# Patient Record
Sex: MALE | Race: WHITE | NOT HISPANIC OR LATINO | URBAN - METROPOLITAN AREA
[De-identification: names, ages, dates, MRNs, and addresses within clinical notes are randomized per-mention and may not be internally consistent; named-entity substitution may affect disease eponyms.]

---

## 2024-01-05 ENCOUNTER — LAB OUTSIDE AN ENCOUNTER (OUTPATIENT)
Dept: URBAN - METROPOLITAN AREA CLINIC 105 | Facility: CLINIC | Age: 69
End: 2024-01-05

## 2024-01-05 ENCOUNTER — OFFICE VISIT (OUTPATIENT)
Dept: URBAN - METROPOLITAN AREA CLINIC 105 | Facility: CLINIC | Age: 69
End: 2024-01-05
Payer: COMMERCIAL

## 2024-01-05 VITALS
HEIGHT: 67 IN | TEMPERATURE: 97 F | SYSTOLIC BLOOD PRESSURE: 155 MMHG | BODY MASS INDEX: 29.44 KG/M2 | HEART RATE: 57 BPM | WEIGHT: 187.6 LBS | DIASTOLIC BLOOD PRESSURE: 69 MMHG

## 2024-01-05 DIAGNOSIS — K21.9 ACID REFLUX: ICD-10-CM

## 2024-01-05 DIAGNOSIS — R14.0 ABDOMINAL BLOATING: ICD-10-CM

## 2024-01-05 DIAGNOSIS — R19.7 ACUTE DIARRHEA: ICD-10-CM

## 2024-01-05 DIAGNOSIS — K62.5 RECTAL BLEEDING: ICD-10-CM

## 2024-01-05 PROBLEM — 428375006: Status: ACTIVE | Noted: 2024-01-05

## 2024-01-05 PROBLEM — 53741008: Status: ACTIVE | Noted: 2024-01-05

## 2024-01-05 PROBLEM — 235595009: Status: ACTIVE | Noted: 2024-01-05

## 2024-01-05 PROBLEM — 86406008: Status: ACTIVE | Noted: 2024-01-05

## 2024-01-05 PROBLEM — 428283002: Status: ACTIVE | Noted: 2024-01-05

## 2024-01-05 PROCEDURE — 99244 OFF/OP CNSLTJ NEW/EST MOD 40: CPT | Performed by: INTERNAL MEDICINE

## 2024-01-05 PROCEDURE — 99204 OFFICE O/P NEW MOD 45 MIN: CPT | Performed by: INTERNAL MEDICINE

## 2024-01-05 RX ORDER — ROSUVASTATIN CALCIUM 20 MG/1
TABLET, FILM COATED ORAL
Qty: 90 TABLET | Status: ACTIVE | COMMUNITY

## 2024-01-05 RX ORDER — BACLOFEN 10 MG/1
TABLET ORAL
Qty: 90 TABLET | Status: ACTIVE | COMMUNITY

## 2024-01-05 RX ORDER — VALACYCLOVIR 500 MG/1
TAKE 1 TABLET BY MOUTH TWICE A DAY TABLET, FILM COATED ORAL
Qty: 16 EACH | Refills: 0 | Status: ON HOLD | COMMUNITY

## 2024-01-05 RX ORDER — TADALAFIL 5 MG/1
TABLET ORAL
Qty: 90 TABLET | Status: ACTIVE | COMMUNITY

## 2024-01-05 RX ORDER — TESTOSTERONE ENANTHATE 100 MG/.5ML
INJECTION SUBCUTANEOUS
Qty: 2 MILLILITER | Status: ACTIVE | COMMUNITY

## 2024-01-05 RX ORDER — VALACYCLOVIR 500 MG/1
TAKE 1 TABLET BY MOUTH TWICE A DAY TABLET, FILM COATED ORAL
Qty: 16 EACH | Refills: 0 | Status: ACTIVE | COMMUNITY

## 2024-01-05 RX ORDER — CABOTEGRAVIR AND RILPIVIRINE 600-900/3
KIT INTRAMUSCULAR
Qty: 6 MILLILITER | Status: ACTIVE | COMMUNITY

## 2024-01-05 RX ORDER — EZETIMIBE 10 MG/1
TABLET ORAL
Qty: 90 TABLET | Status: ACTIVE | COMMUNITY

## 2024-01-05 RX ORDER — OMEPRAZOLE 40 MG/1
1 CAPSULE 30 MINUTES BEFORE MORNING MEAL CAPSULE, DELAYED RELEASE ORAL ONCE A DAY
Qty: 90 | Refills: 0 | OUTPATIENT
Start: 2024-01-05

## 2024-01-05 RX ORDER — TRAMADOL HYDROCHLORIDE 50 MG/1
TABLET, FILM COATED ORAL
Qty: 28 TABLET | Status: ACTIVE | COMMUNITY

## 2024-01-05 NOTE — HPI-TODAY'S VISIT:
New patient 69 yo male with PMH of HIV, CAD s/p stent placement x2, and HTN referred by Dr. Eliseo Christy for an EGD consult.   Patient has symptoms of regurgitation, dry cough, heartburn, nausea, abdominal bloating, and early satiety. Symptoms have been prominent for years and occurs mostly at night. He used to take Pepto Bismol for relieve until his PCP had him discontinue it.  Last Saturday he had early satiety in the morning, he had grits with fruit and was full until dinner. During dinner time he was only able to have a few bites of his meal. He takes ASA daily for CAD. He uses Ibuprofen for leg pain.  Denies dysphagia, melena and unintentional weight loss. Diarrhea x 2 weeks. It is currently active at the moment. About 4-5 stools a day of loose to watery, explosive stools when it first started. Now he describes his stools as "many soft pellets".  He had chills but denies fever. He is currently taking Imodium as needed.  He admits to some intermittent rectal bleeding on TP. He denies hx of constipation. Usually has a BM every morning with complete evacuation. Denies recent travel or antibiotic use.

## 2024-01-13 LAB
ADENOVIRUS F 40/41: NOT DETECTED
CAMPYLOBACTER: NOT DETECTED
CLOSTRIDIUM DIFFICILE: NOT DETECTED
ENTAMOEBA HISTOLYTICA: NOT DETECTED
ENTEROAGGREGATIVE E.COLI: NOT DETECTED
ENTEROTOXIGENIC E.COLI: NOT DETECTED
ESCHERICHIA COLI O157: NOT DETECTED
GIARDIA LAMBLIA: NOT DETECTED
NOROVIRUS GI/GII: NOT DETECTED
ROTAVIRUS A: NOT DETECTED
SALMONELLA SPP.: NOT DETECTED
SHIGA-LIKE TOXIN PRODUCING E.COLI: NOT DETECTED
SHIGELLA SPP. / ENTEROINVASIVE E.COLI: NOT DETECTED
VIBRIO PARAHAEMOLYTICUS: NOT DETECTED
VIBRIO SPP.: NOT DETECTED
YERSINIA ENTEROCOLITICA: NOT DETECTED

## 2024-01-17 ENCOUNTER — TELEPHONE ENCOUNTER (OUTPATIENT)
Dept: URBAN - METROPOLITAN AREA SURGERY CENTER 16 | Facility: SURGERY CENTER | Age: 69
End: 2024-01-17

## 2024-01-18 LAB
A/G RATIO: 1.6
ABSOLUTE BASOPHILS: 31
ABSOLUTE EOSINOPHILS: 92
ABSOLUTE LYMPHOCYTES: 1885
ABSOLUTE MONOCYTES: 647
ABSOLUTE NEUTROPHILS: 3447
ALBUMIN: 4.2
ALKALINE PHOSPHATASE: 65
ALT (SGPT): 25
AST (SGOT): 26
BASOPHILS: 0.5
BILIRUBIN, TOTAL: 0.4
BUN/CREATININE RATIO: (no result)
BUN: 19
CALCIUM: 8.8
CARBON DIOXIDE, TOTAL: 28
CHLORIDE: 102
CREATININE: 1.28
EGFR: 62
EOSINOPHILS: 1.5
FERRITIN, SERUM: 139
GLOBULIN, TOTAL: 2.6
GLUCOSE: 99
HEMATOCRIT: 41.2
HEMOGLOBIN: 14.2
IRON BIND.CAP.(TIBC): 312
IRON SATURATION: 16
IRON: 51
LIPASE: 23
LYMPHOCYTES: 30.9
MCH: 28.6
MCHC: 34.5
MCV: 82.9
MONOCYTES: 10.6
MPV: 11
NEUTROPHILS: 56.5
PLATELET COUNT: 163
POTASSIUM: 4.1
PROTEIN, TOTAL: 6.8
RDW: 14.6
RED BLOOD CELL COUNT: 4.97
SODIUM: 139
TSH W/REFLEX TO FT4: 1.89
WHITE BLOOD CELL COUNT: 6.1

## 2024-01-25 ENCOUNTER — OUT OF OFFICE VISIT (OUTPATIENT)
Dept: URBAN - METROPOLITAN AREA SURGERY CENTER 16 | Facility: SURGERY CENTER | Age: 69
End: 2024-01-25
Payer: COMMERCIAL

## 2024-01-25 ENCOUNTER — CLAIMS CREATED FROM THE CLAIM WINDOW (OUTPATIENT)
Dept: URBAN - METROPOLITAN AREA CLINIC 4 | Facility: CLINIC | Age: 69
End: 2024-01-25
Payer: COMMERCIAL

## 2024-01-25 DIAGNOSIS — K29.60 ADENOPAPILLOMATOSIS GASTRICA: ICD-10-CM

## 2024-01-25 DIAGNOSIS — K64.8 EXTERNAL HEMORRHOIDS: ICD-10-CM

## 2024-01-25 DIAGNOSIS — K62.89 ACUTE PROCTITIS: ICD-10-CM

## 2024-01-25 DIAGNOSIS — K21.9 GASTRO-ESOPHAGEAL REFLUX DISEASE WITHOUT ESOPHAGITIS: ICD-10-CM

## 2024-01-25 DIAGNOSIS — K57.30 ACQUIRED DIVERTICULOSIS OF COLON: ICD-10-CM

## 2024-01-25 DIAGNOSIS — K31.89 ACHYLIA: ICD-10-CM

## 2024-01-25 DIAGNOSIS — K21.9 ACID REFLUX: ICD-10-CM

## 2024-01-25 DIAGNOSIS — K63.89 APPENDICITIS EPIPLOICA: ICD-10-CM

## 2024-01-25 DIAGNOSIS — K31.89 OTHER DISEASES OF STOMACH AND DUODENUM: ICD-10-CM

## 2024-01-25 DIAGNOSIS — R19.7 ACUTE DIARRHEA: ICD-10-CM

## 2024-01-25 DIAGNOSIS — K29.70 GASTRITIS, UNSPECIFIED, WITHOUT BLEEDING: ICD-10-CM

## 2024-01-25 PROCEDURE — 45380 COLONOSCOPY AND BIOPSY: CPT | Performed by: INTERNAL MEDICINE

## 2024-01-25 PROCEDURE — 00813 ANES UPR LWR GI NDSC PX: CPT | Performed by: NURSE ANESTHETIST, CERTIFIED REGISTERED

## 2024-01-25 PROCEDURE — 88312 SPECIAL STAINS GROUP 1: CPT | Performed by: PATHOLOGY

## 2024-01-25 PROCEDURE — 43239 EGD BIOPSY SINGLE/MULTIPLE: CPT | Performed by: INTERNAL MEDICINE

## 2024-01-25 PROCEDURE — G8907 PT DOC NO EVENTS ON DISCHARG: HCPCS | Performed by: INTERNAL MEDICINE

## 2024-01-25 PROCEDURE — 88342 IMHCHEM/IMCYTCHM 1ST ANTB: CPT | Performed by: PATHOLOGY

## 2024-01-25 PROCEDURE — 88305 TISSUE EXAM BY PATHOLOGIST: CPT | Performed by: PATHOLOGY

## 2024-01-25 PROCEDURE — 00813 ANES UPR LWR GI NDSC PX: CPT | Performed by: ANESTHESIOLOGY

## 2024-01-25 RX ORDER — CABOTEGRAVIR AND RILPIVIRINE 600-900/3
KIT INTRAMUSCULAR
Qty: 6 MILLILITER | Status: ACTIVE | COMMUNITY

## 2024-01-25 RX ORDER — OMEPRAZOLE 40 MG/1
1 CAPSULE 30 MINUTES BEFORE MORNING MEAL CAPSULE, DELAYED RELEASE ORAL ONCE A DAY
Qty: 90 | Refills: 0 | Status: ACTIVE | COMMUNITY
Start: 2024-01-05

## 2024-01-25 RX ORDER — BACLOFEN 10 MG/1
TABLET ORAL
Qty: 90 TABLET | Status: ACTIVE | COMMUNITY

## 2024-01-25 RX ORDER — TESTOSTERONE ENANTHATE 100 MG/.5ML
INJECTION SUBCUTANEOUS
Qty: 2 MILLILITER | Status: ACTIVE | COMMUNITY

## 2024-01-25 RX ORDER — VALACYCLOVIR 500 MG/1
TAKE 1 TABLET BY MOUTH TWICE A DAY TABLET, FILM COATED ORAL
Qty: 16 EACH | Refills: 0 | Status: ON HOLD | COMMUNITY

## 2024-01-25 RX ORDER — VALACYCLOVIR 500 MG/1
TAKE 1 TABLET BY MOUTH TWICE A DAY TABLET, FILM COATED ORAL
Qty: 16 EACH | Refills: 0 | Status: ACTIVE | COMMUNITY

## 2024-01-25 RX ORDER — EZETIMIBE 10 MG/1
TABLET ORAL
Qty: 90 TABLET | Status: ACTIVE | COMMUNITY

## 2024-01-25 RX ORDER — ROSUVASTATIN CALCIUM 20 MG/1
TABLET, FILM COATED ORAL
Qty: 90 TABLET | Status: ACTIVE | COMMUNITY

## 2024-01-25 RX ORDER — TADALAFIL 5 MG/1
TABLET ORAL
Qty: 90 TABLET | Status: ACTIVE | COMMUNITY

## 2024-01-25 RX ORDER — TRAMADOL HYDROCHLORIDE 50 MG/1
TABLET, FILM COATED ORAL
Qty: 28 TABLET | Status: ACTIVE | COMMUNITY

## 2024-01-29 LAB — TRICHROME (1): (no result)

## 2024-01-30 ENCOUNTER — TELEPHONE ENCOUNTER (OUTPATIENT)
Dept: URBAN - METROPOLITAN AREA CLINIC 105 | Facility: CLINIC | Age: 69
End: 2024-01-30

## 2024-02-14 ENCOUNTER — OV EP (OUTPATIENT)
Dept: URBAN - METROPOLITAN AREA CLINIC 17 | Facility: CLINIC | Age: 69
End: 2024-02-14

## 2024-02-14 RX ORDER — EZETIMIBE 10 MG/1
TABLET ORAL
Qty: 90 TABLET | COMMUNITY

## 2024-02-14 RX ORDER — TRAMADOL HYDROCHLORIDE 50 MG/1
TABLET, FILM COATED ORAL
Qty: 28 TABLET | COMMUNITY

## 2024-02-14 RX ORDER — BACLOFEN 10 MG/1
TABLET ORAL
Qty: 90 TABLET | COMMUNITY

## 2024-02-14 RX ORDER — VALACYCLOVIR 500 MG/1
TAKE 1 TABLET BY MOUTH TWICE A DAY TABLET, FILM COATED ORAL
Qty: 16 EACH | Refills: 0 | COMMUNITY

## 2024-02-14 RX ORDER — OMEPRAZOLE 40 MG/1
1 CAPSULE 30 MINUTES BEFORE MORNING MEAL CAPSULE, DELAYED RELEASE ORAL ONCE A DAY
Qty: 90 | Refills: 0 | COMMUNITY
Start: 2024-01-05

## 2024-02-14 RX ORDER — CABOTEGRAVIR AND RILPIVIRINE 600-900/3
KIT INTRAMUSCULAR
Qty: 6 MILLILITER | COMMUNITY

## 2024-02-14 RX ORDER — ROSUVASTATIN CALCIUM 20 MG/1
TABLET, FILM COATED ORAL
Qty: 90 TABLET | COMMUNITY

## 2024-02-14 RX ORDER — TADALAFIL 5 MG/1
TABLET ORAL
Qty: 90 TABLET | COMMUNITY

## 2024-02-14 RX ORDER — TESTOSTERONE ENANTHATE 100 MG/.5ML
INJECTION SUBCUTANEOUS
Qty: 2 MILLILITER | COMMUNITY